# Patient Record
Sex: MALE
[De-identification: names, ages, dates, MRNs, and addresses within clinical notes are randomized per-mention and may not be internally consistent; named-entity substitution may affect disease eponyms.]

---

## 2024-01-16 PROBLEM — Z00.00 ENCOUNTER FOR PREVENTIVE HEALTH EXAMINATION: Status: ACTIVE | Noted: 2024-01-16

## 2024-01-30 ENCOUNTER — LABORATORY RESULT (OUTPATIENT)
Age: 24
End: 2024-01-30

## 2024-01-30 ENCOUNTER — NON-APPOINTMENT (OUTPATIENT)
Age: 24
End: 2024-01-30

## 2024-01-30 ENCOUNTER — TRANSCRIPTION ENCOUNTER (OUTPATIENT)
Age: 24
End: 2024-01-30

## 2024-01-30 ENCOUNTER — APPOINTMENT (OUTPATIENT)
Dept: TRANSGENDER CARE | Facility: CLINIC | Age: 24
End: 2024-01-30

## 2024-01-30 ENCOUNTER — APPOINTMENT (OUTPATIENT)
Dept: TRANSGENDER CARE | Facility: CLINIC | Age: 24
End: 2024-01-30
Payer: COMMERCIAL

## 2024-01-30 VITALS
OXYGEN SATURATION: 98 % | SYSTOLIC BLOOD PRESSURE: 104 MMHG | DIASTOLIC BLOOD PRESSURE: 68 MMHG | HEIGHT: 63 IN | WEIGHT: 195 LBS | BODY MASS INDEX: 34.55 KG/M2 | TEMPERATURE: 98.6 F | HEART RATE: 80 BPM

## 2024-01-30 PROCEDURE — 36415 COLL VENOUS BLD VENIPUNCTURE: CPT

## 2024-01-30 PROCEDURE — G2211 COMPLEX E/M VISIT ADD ON: CPT

## 2024-01-30 PROCEDURE — 99205 OFFICE O/P NEW HI 60 MIN: CPT

## 2024-01-30 RX ORDER — VITAMIN B COMPLEX
CAPSULE ORAL
Refills: 0 | Status: ACTIVE | COMMUNITY

## 2024-01-30 RX ORDER — MULTIVIT-MIN/IRON/FOLIC ACID/K 18-600-40
50 MCG CAPSULE ORAL
Refills: 0 | Status: ACTIVE | COMMUNITY

## 2024-01-30 RX ORDER — LEVONORGESTREL 52 MG/1
INTRAUTERINE DEVICE INTRAUTERINE
Refills: 0 | Status: ACTIVE | COMMUNITY

## 2024-01-30 NOTE — HISTORY OF PRESENT ILLNESS
[de-identified] : YEVGENIY BAKER is a 23 year old person seen on 01/30/2024 for initial transgender care program intake visit. Pronouns: he/him Sexual orientation: garcia Gender identity: trans man  Assigned at birth: AFAB Organ inventory: s/p top surgery in 2021 Specific Terms for Body Parts: anatomical is OK  Call pt: Yevgeniy    Partner Status: In a monogamous relationship with cis male partner  Social history: From [place]. Currently living alone with a cat in an apartment via school (student housing)  Transition HX + Family Support + Present Life: Support from family pretty good. Mom and stepdad supportive, dad a little more of a challenge, but they just do not talk about it much. Started questioning gender around age 13, realized around age 14. Came out to friends around that time, to parents a few years later. Started hormones age 20, top surgery age 21  Who is aware:  Most people in family/friend group. Stealth at school.   Education | Employment: [employer], studying law   Hobbies/Activities: Not a whole lot due to chronic fatigue. TV.   Tattoos/ Piercing: None   Cigarette, Vaping, Marijuana, Alcohol, and Drug Use: Rare EtOH    Medical and mental health history: Mental health is "bleh" right now fish related to the chronic fatigue concerns  Mental Health dx: ADHD, MDD, BRIAN, chronic fatigue. Sees neurologist for this. Has a therapist, no psychiatrist.   History of mental health admission: Denies   History of Suicidal/homicidal ideation & Self harm: Very remote history of self harm and suicidal ideation    Medical history: Started testosterone at age 20   Medications: 0.15 mL qWeekly of testosterone cypionate  Mirena IUD placed May 2022   Sexual health:  Active with: One cis man, monogamous   Last HIV test: Declines   Last STI tests and sites: Declines    Gender:  Feelings of Gender Dysphoria/ Body Dysmorphia: Pretty good with how things are at   Reproductive Endocrinology: Not interested   Gender Affirming Surgery: s/p top surgery, possibly considering hysterectomy in future   Binding/Tucking: None   Name Change + Gender Marker: Already done   Transition goals: Continue testosterone therapy    Other: PCP and [facility] here. Would like to change antidepressant medications, needs ongoing chronic fatigue workup.  Has always been on fluoxetine for mental health, added adderall XR. Had tried modafinil in the past for fatigue but had panic attacks on it. Chronic fatigue has been lifelong. Describes sleep as generally fine but has every few months some insomnia. Gets 7-9 hours a night, takes trazodone to fall asleep. Feels a crash after physical activity. Struggles with studying due to fatigue. Drinks a lot of caffeine/soda. Dr Pepper 2 a day, switched to Dr Pepper zero. Had bloodwork with neurology showing vitamin deficiency.   Chronic fatigue w extensive work up - iron, sleep study, thyroid all WNL

## 2024-01-30 NOTE — ASSESSMENT
[FreeTextEntry1] : #GAC Shot on Saturday - mid cycle now  Had been on 0.3 mL for a while, but was decreased to 0.15 mL by prior provider due to high levels on labs - unsure what, possibly elevated HCT.  Labs today -- will f/u with possibility of increasing levels  Fertility preservation declined previously  On mirena for menstrual suppression/contraception   #Chronic fatigue Extensive prior workup patient has labs with him including extensive testing for vitamin levels, lyme, thyroid, rheum, etc, and prior sleep studies completed due ot ocncern for DANIEL or narcolepsy and all have been negative. He is on adderall now which has been helpful but not enoug hto get through the day with [employer]. Interested in trial of short acting booster pill for the afternoons, prescribed. Would be itnerested in changing from fluoxetine but not a good time to attempt to wean right now. Etiology of chronic fatigue is unclear;  suspect an etiology will not be found and will focus on symptomatic tx. For now labs per orders to evaluate.  istop pending - website is down

## 2024-01-31 ENCOUNTER — TRANSCRIPTION ENCOUNTER (OUTPATIENT)
Age: 24
End: 2024-01-31

## 2024-01-31 LAB
ALBUMIN SERPL ELPH-MCNC: 4.9 G/DL
ALP BLD-CCNC: 71 U/L
ALT SERPL-CCNC: 22 U/L
ANION GAP SERPL CALC-SCNC: 12 MMOL/L
AST SERPL-CCNC: 14 U/L
B BURGDOR AB SER-IMP: NEGATIVE
B BURGDOR IGG+IGM SER QL: 0.22 INDEX
BASOPHILS # BLD AUTO: 0.03 K/UL
BASOPHILS NFR BLD AUTO: 0.4 %
BILIRUB SERPL-MCNC: 0.3 MG/DL
BUN SERPL-MCNC: 11 MG/DL
CALCIUM SERPL-MCNC: 9.8 MG/DL
CHLORIDE SERPL-SCNC: 101 MMOL/L
CHOLEST SERPL-MCNC: 222 MG/DL
CO2 SERPL-SCNC: 25 MMOL/L
CREAT SERPL-MCNC: 0.76 MG/DL
EGFR: 131 ML/MIN/1.73M2
EOSINOPHIL # BLD AUTO: 0.07 K/UL
EOSINOPHIL NFR BLD AUTO: 1 %
ESTIMATED AVERAGE GLUCOSE: 100 MG/DL
ESTRADIOL SERPL-MCNC: 94 PG/ML
FSH SERPL-MCNC: 3 IU/L
GLUCOSE SERPL-MCNC: 105 MG/DL
HBA1C MFR BLD HPLC: 5.1 %
HCG SERPL-MCNC: <1 MIU/ML
HCT VFR BLD CALC: 45.3 %
HDLC SERPL-MCNC: 35 MG/DL
HGB BLD-MCNC: 15.2 G/DL
IMM GRANULOCYTES NFR BLD AUTO: 0.3 %
IRON SATN MFR SERPL: 31 %
IRON SERPL-MCNC: 84 UG/DL
LDLC SERPL CALC-MCNC: 125 MG/DL
LH SERPL-ACNC: 3.4 IU/L
LYMPHOCYTES # BLD AUTO: 2.01 K/UL
LYMPHOCYTES NFR BLD AUTO: 29.2 %
MAN DIFF?: NORMAL
MCHC RBC-ENTMCNC: 30 PG
MCHC RBC-ENTMCNC: 33.6 GM/DL
MCV RBC AUTO: 89.5 FL
MONOCYTES # BLD AUTO: 0.32 K/UL
MONOCYTES NFR BLD AUTO: 4.6 %
NEUTROPHILS # BLD AUTO: 4.44 K/UL
NEUTROPHILS NFR BLD AUTO: 64.5 %
NONHDLC SERPL-MCNC: 187 MG/DL
PLATELET # BLD AUTO: 374 K/UL
POTASSIUM SERPL-SCNC: 4.3 MMOL/L
PROT SERPL-MCNC: 6.7 G/DL
RBC # BLD: 5.06 M/UL
RBC # FLD: 12.2 %
SODIUM SERPL-SCNC: 137 MMOL/L
TESTOST SERPL-MCNC: 364 NG/DL
TIBC SERPL-MCNC: 271 UG/DL
TRIGL SERPL-MCNC: 346 MG/DL
TSH SERPL-ACNC: 1.23 UIU/ML
UIBC SERPL-MCNC: 187 UG/DL
WBC # FLD AUTO: 6.89 K/UL

## 2024-02-02 ENCOUNTER — TRANSCRIPTION ENCOUNTER (OUTPATIENT)
Age: 24
End: 2024-02-02

## 2024-02-02 LAB
EBV EA AB SER IA-ACNC: 8.4 U/ML
EBV EA AB TITR SER IF: POSITIVE
EBV EA IGG SER QL IA: >600 U/ML
EBV EA IGG SER-ACNC: NEGATIVE
EBV EA IGM SER IA-ACNC: NEGATIVE
EBV PATRN SPEC IB-IMP: NORMAL
EBV VCA IGG SER IA-ACNC: 53.8 U/ML
EBV VCA IGM SER QL IA: <10 U/ML
EPSTEIN-BARR VIRUS CAPSID ANTIGEN IGG: POSITIVE

## 2024-02-05 ENCOUNTER — TRANSCRIPTION ENCOUNTER (OUTPATIENT)
Age: 24
End: 2024-02-05

## 2024-02-05 LAB
A PHAGO GROEL BLD QL NAA+NON-PROBE: NEGATIVE
E CANIS+EWIN GROEL BLD QL NAA+NON-PROBE: NEGATIVE
E CHAFF GROEL BLD QL NAA+NON-PROBE: NEGATIVE
E MURIS EAUCL GROEL BLD QL NAA+NON-PRB: NEGATIVE

## 2024-02-06 ENCOUNTER — TRANSCRIPTION ENCOUNTER (OUTPATIENT)
Age: 24
End: 2024-02-06

## 2024-02-20 ENCOUNTER — TRANSCRIPTION ENCOUNTER (OUTPATIENT)
Age: 24
End: 2024-02-20

## 2024-02-27 ENCOUNTER — APPOINTMENT (OUTPATIENT)
Dept: TRANSGENDER CARE | Facility: CLINIC | Age: 24
End: 2024-02-27
Payer: COMMERCIAL

## 2024-02-27 VITALS
DIASTOLIC BLOOD PRESSURE: 72 MMHG | HEART RATE: 110 BPM | TEMPERATURE: 98.5 F | SYSTOLIC BLOOD PRESSURE: 108 MMHG | WEIGHT: 195 LBS | HEIGHT: 63 IN | OXYGEN SATURATION: 98 % | BODY MASS INDEX: 34.55 KG/M2

## 2024-02-27 DIAGNOSIS — F90.9 ATTENTION-DEFICIT HYPERACTIVITY DISORDER, UNSPECIFIED TYPE: ICD-10-CM

## 2024-02-27 PROCEDURE — G2211 COMPLEX E/M VISIT ADD ON: CPT

## 2024-02-27 PROCEDURE — 99215 OFFICE O/P EST HI 40 MIN: CPT

## 2024-02-27 NOTE — ASSESSMENT
[FreeTextEntry1] : #GAC Continue testosterone at 0.25 mL sub Q weekly, RTC for labs in 4-6 weeks to assess  #Health maintenance declines pap indefinitely  #Mental health and chronic fatigue Doing well on additional adderrall, interested in meeting with psych to discuss other medication options, referral placed

## 2024-02-27 NOTE — HISTORY OF PRESENT ILLNESS
[de-identified] : -- Felt normal on higher dose of T but ran out so only did 1 inj at this dose, had to do only 0.13 mL this sunday due to none left  -- Switch to mail order pharmacy  -- Doing well on the second Adderall daily, helping with attention and fatigue   -- interested in seeing psych to discuss adjunct meds or switching to other antidepressants  -- declines pap indefinitely

## 2024-03-06 ENCOUNTER — TRANSCRIPTION ENCOUNTER (OUTPATIENT)
Age: 24
End: 2024-03-06

## 2024-03-06 RX ORDER — FLUOXETINE HYDROCHLORIDE 40 MG/1
40 CAPSULE ORAL
Qty: 90 | Refills: 3 | Status: ACTIVE | COMMUNITY
Start: 1900-01-01 | End: 1900-01-01

## 2024-03-06 RX ORDER — DEXTROAMPHETAMINE SACCHARATE, AMPHETAMINE ASPARTATE, DEXTROAMPHETAMINE SULFATE AND AMPHETAMINE SULFATE 1.25; 1.25; 1.25; 1.25 MG/1; MG/1; MG/1; MG/1
5 TABLET ORAL
Qty: 30 | Refills: 0 | Status: ACTIVE | COMMUNITY
Start: 2024-01-30 | End: 1900-01-01

## 2024-03-06 RX ORDER — TRAZODONE HYDROCHLORIDE 50 MG/1
50 TABLET ORAL
Qty: 30 | Refills: 2 | Status: ACTIVE | COMMUNITY

## 2024-03-11 ENCOUNTER — TRANSCRIPTION ENCOUNTER (OUTPATIENT)
Age: 24
End: 2024-03-11

## 2024-03-11 RX ORDER — DEXTROAMPHETAMINE SACCHARATE, AMPHETAMINE ASPARTATE MONOHYDRATE, DEXTROAMPHETAMINE SULFATE AND AMPHETAMINE SULFATE 6.25; 6.25; 6.25; 6.25 MG/1; MG/1; MG/1; MG/1
25 CAPSULE, EXTENDED RELEASE ORAL DAILY
Qty: 30 | Refills: 0 | Status: ACTIVE | COMMUNITY
Start: 1900-01-01 | End: 1900-01-01

## 2024-03-28 ENCOUNTER — APPOINTMENT (OUTPATIENT)
Dept: TRANSGENDER CARE | Facility: CLINIC | Age: 24
End: 2024-03-28
Payer: COMMERCIAL

## 2024-03-28 VITALS
SYSTOLIC BLOOD PRESSURE: 116 MMHG | TEMPERATURE: 98.7 F | BODY MASS INDEX: 34.55 KG/M2 | DIASTOLIC BLOOD PRESSURE: 84 MMHG | HEART RATE: 121 BPM | OXYGEN SATURATION: 98 % | WEIGHT: 195 LBS | HEIGHT: 63 IN

## 2024-03-28 DIAGNOSIS — F64.9 GENDER IDENTITY DISORDER, UNSPECIFIED: ICD-10-CM

## 2024-03-28 DIAGNOSIS — Z12.4 ENCOUNTER FOR SCREENING FOR MALIGNANT NEOPLASM OF CERVIX: ICD-10-CM

## 2024-03-28 PROCEDURE — 99215 OFFICE O/P EST HI 40 MIN: CPT | Mod: 25

## 2024-03-28 PROCEDURE — 36415 COLL VENOUS BLD VENIPUNCTURE: CPT

## 2024-03-28 NOTE — ASSESSMENT
[FreeTextEntry1] : -- Last inj on Sunday late mid cycle today -- Pelvic exam WNL. Pap completed. Discussed cramping/spotting likely within normal for patient. Lowering testosterone may help, pending levels today.  Was unable to cut strings due to no appropriate scissor available, but strings are a bit long and cutting may alleviate some discomfort for patient, office will obtain to provide this service  -- RTC to discuss chronic fatigue syndrome as possible dx

## 2024-03-28 NOTE — PHYSICAL EXAM
[External Female Genitalia] : normal external genitalia [Vagina] : normal vaginal exam [Cervix] : normal cervix [FreeTextEntry1] : Mucus noted at cervix. Some friability. IUD strings visualized and intact. No IUD body visualized inside os.

## 2024-03-28 NOTE — HISTORY OF PRESENT ILLNESS
[de-identified] : Here for IUD lior Has had over last week occasional sharp intense cramps where he had to sit down to severity Had spotting as well, including some heavier than usual - normally has spotting a little every few months, this was more than he normally experiences [place] that IUD strings felt low on palpation  Had sex about 2 weeks ago and this started about a week after   Started seeing psych- added wellbutrin, going to switch to SNRI   Is on 0.25 mL testosterone weekly  Feeling very hot, thinks maybe levels are too high

## 2024-03-29 ENCOUNTER — TRANSCRIPTION ENCOUNTER (OUTPATIENT)
Age: 24
End: 2024-03-29

## 2024-03-29 LAB
BASOPHILS # BLD AUTO: 0.04 K/UL
BASOPHILS NFR BLD AUTO: 0.5 %
EOSINOPHIL # BLD AUTO: 0.11 K/UL
EOSINOPHIL NFR BLD AUTO: 1.3 %
ESTRADIOL SERPL-MCNC: 58 PG/ML
FSH SERPL-MCNC: 4.5 IU/L
HCG SERPL-MCNC: <1 MIU/ML
HCT VFR BLD CALC: 45.2 %
HGB BLD-MCNC: 15.3 G/DL
IMM GRANULOCYTES NFR BLD AUTO: 0.2 %
LH SERPL-ACNC: 4.2 IU/L
LYMPHOCYTES # BLD AUTO: 2.94 K/UL
LYMPHOCYTES NFR BLD AUTO: 35.8 %
MAN DIFF?: NORMAL
MCHC RBC-ENTMCNC: 29.7 PG
MCHC RBC-ENTMCNC: 33.8 GM/DL
MCV RBC AUTO: 87.8 FL
MONOCYTES # BLD AUTO: 0.48 K/UL
MONOCYTES NFR BLD AUTO: 5.8 %
NEUTROPHILS # BLD AUTO: 4.63 K/UL
NEUTROPHILS NFR BLD AUTO: 56.4 %
PLATELET # BLD AUTO: 411 K/UL
RBC # BLD: 5.15 M/UL
RBC # FLD: 12.8 %
TESTOST SERPL-MCNC: 391 NG/DL
TSH SERPL-ACNC: 1.36 UIU/ML
WBC # FLD AUTO: 8.22 K/UL

## 2024-04-02 ENCOUNTER — TRANSCRIPTION ENCOUNTER (OUTPATIENT)
Age: 24
End: 2024-04-02

## 2024-04-02 LAB — CYTOLOGY CVX/VAG DOC THIN PREP: NORMAL

## 2024-04-23 ENCOUNTER — APPOINTMENT (OUTPATIENT)
Dept: TRANSGENDER CARE | Facility: CLINIC | Age: 24
End: 2024-04-23
Payer: COMMERCIAL

## 2024-04-23 DIAGNOSIS — Z87.898 PERSONAL HISTORY OF OTHER SPECIFIED CONDITIONS: ICD-10-CM

## 2024-04-23 DIAGNOSIS — F32.A DEPRESSION, UNSPECIFIED: ICD-10-CM

## 2024-04-23 DIAGNOSIS — M25.50 PAIN IN UNSPECIFIED JOINT: ICD-10-CM

## 2024-04-23 PROCEDURE — 99215 OFFICE O/P EST HI 40 MIN: CPT

## 2024-04-23 RX ORDER — BUPROPION HYDROCHLORIDE 300 MG/1
300 TABLET, EXTENDED RELEASE ORAL
Refills: 0 | Status: ACTIVE | COMMUNITY

## 2024-04-23 RX ORDER — BUPROPION HYDROCHLORIDE 75 MG/1
75 TABLET, FILM COATED ORAL
Refills: 0 | Status: DISCONTINUED | COMMUNITY
End: 2024-04-23

## 2024-04-23 NOTE — HISTORY OF PRESENT ILLNESS
[Home] : at home, [unfilled] , at the time of the visit. [Medical Office: ([facility])___] : at the medical office located in  [Verbal consent obtained from patient] : the patient, [unfilled] [FreeTextEntry1] : Patient is located at home address in [facility] [de-identified] : Here to f/u regarding joint pain  It is "most joints," but primarily knees and ankles  "I sound like a rice krispy treat sometimes" because everything pops all of the time  Standing for long periods of time is hard, very painful; and getting up from sitting, ankles will lock  Has been unable to walk at times due to pain in L ankle.  L ankle, R knee are worst  Denies swelling or redness Occasionally some pain in hands when using them a lot, but mostly it is larger joints of lower extremity Denies noticing any hypermobility  Denies fhx of rheum disorder Has redness in face, but denies rash   Interested in taking supplements, was taking creatine but stopping due to light headedness Taking d-ribose, recommended by a ME/CFS expert found online   He and his partner are looking to move to [place], planning to move next month

## 2024-04-23 NOTE — ASSESSMENT
[FreeTextEntry1] : Suspect patient may be experiencing fibromyalgia vs ME/CFS vs some combination of these syndromes; persistent EBV IGG consistent with ME/CFS; patient unsure if had prior rheum w/u, should rule out rheumatologic disease, labs ordered today; he will complete them at lab. Unable to complete physical exam due to nature of telehealth visit. If rheum w/u neg can f/u with rheum per patient preference. Would consider trial of fludricortisone +/- atenolol for potential symptomatic relief if ME/CFS is suspected as underlying dx, to f/u

## 2024-04-29 ENCOUNTER — TRANSCRIPTION ENCOUNTER (OUTPATIENT)
Age: 24
End: 2024-04-29

## 2024-04-30 ENCOUNTER — TRANSCRIPTION ENCOUNTER (OUTPATIENT)
Age: 24
End: 2024-04-30

## 2024-05-14 ENCOUNTER — TRANSCRIPTION ENCOUNTER (OUTPATIENT)
Age: 24
End: 2024-05-14

## 2024-05-15 ENCOUNTER — TRANSCRIPTION ENCOUNTER (OUTPATIENT)
Age: 24
End: 2024-05-15

## 2024-05-15 DIAGNOSIS — G93.32 MYALGIC ENCEPHALOMYELITIS/CHRONIC FATIGUE SYNDROME: ICD-10-CM

## 2024-05-15 LAB
ANA SER IF-ACNC: NEGATIVE
CCP AB SER IA-ACNC: <8 UNITS
CRP SERPL-MCNC: <3 MG/L
ERYTHROCYTE [SEDIMENTATION RATE] IN BLOOD BY WESTERGREN METHOD: 9 MM/HR
RF+CCP IGG SER-IMP: NEGATIVE
RHEUMATOID FACT SER QL: 12 IU/ML

## 2024-06-04 ENCOUNTER — TRANSCRIPTION ENCOUNTER (OUTPATIENT)
Age: 24
End: 2024-06-04

## 2024-06-11 ENCOUNTER — TRANSCRIPTION ENCOUNTER (OUTPATIENT)
Age: 24
End: 2024-06-11

## 2024-06-12 ENCOUNTER — TRANSCRIPTION ENCOUNTER (OUTPATIENT)
Age: 24
End: 2024-06-12

## 2024-06-24 ENCOUNTER — TRANSCRIPTION ENCOUNTER (OUTPATIENT)
Age: 24
End: 2024-06-24

## 2024-06-24 RX ORDER — TESTOSTERONE CYPIONATE 200 MG/ML
200 INJECTION, SOLUTION INTRAMUSCULAR
Qty: 10 | Refills: 0 | Status: ACTIVE | COMMUNITY
Start: 2024-01-30 | End: 1900-01-01

## 2024-07-09 ENCOUNTER — TRANSCRIPTION ENCOUNTER (OUTPATIENT)
Age: 24
End: 2024-07-09

## 2024-08-19 ENCOUNTER — NON-APPOINTMENT (OUTPATIENT)
Age: 24
End: 2024-08-19

## 2024-08-19 ENCOUNTER — TRANSCRIPTION ENCOUNTER (OUTPATIENT)
Age: 24
End: 2024-08-19

## 2025-07-21 ENCOUNTER — TRANSCRIPTION ENCOUNTER (OUTPATIENT)
Age: 25
End: 2025-07-21

## 2025-07-21 ENCOUNTER — NON-APPOINTMENT (OUTPATIENT)
Age: 25
End: 2025-07-21